# Patient Record
Sex: MALE | Race: WHITE | Employment: PART TIME | URBAN - METROPOLITAN AREA
[De-identification: names, ages, dates, MRNs, and addresses within clinical notes are randomized per-mention and may not be internally consistent; named-entity substitution may affect disease eponyms.]

---

## 2023-04-14 NOTE — DISCHARGE INSTRUCTIONS
Follow-up with your primary care physician in one week if symptoms have not improved or symptoms are starting to get worse. Increase fluids, keep well-hydrated. Take Tylenol and Motrin for fever and pain. Start taking over-the-counter antihistamines such as Zyrtec, Allegra, Claritin or Xyzal choose 1 take nightly  Over-the-counter Flonase can be helpful  Gargle with warm salt water  Return to the emergency room if your symptoms or concerns.

## 2023-06-10 ENCOUNTER — HOSPITAL ENCOUNTER (OUTPATIENT)
Age: 23
Discharge: HOME | End: 2023-06-10
Payer: MEDICAID

## 2023-06-10 VITALS
DIASTOLIC BLOOD PRESSURE: 83 MMHG | RESPIRATION RATE: 18 BRPM | OXYGEN SATURATION: 97 % | BODY MASS INDEX: 25.83 KG/M2 | HEART RATE: 99 BPM | HEIGHT: 65 IN | TEMPERATURE: 98 F | WEIGHT: 155 LBS | SYSTOLIC BLOOD PRESSURE: 121 MMHG

## 2023-06-10 DIAGNOSIS — L30.9 DERMATITIS: ICD-10-CM

## 2023-06-10 DIAGNOSIS — L03.116 CELLULITIS OF LEFT LOWER EXTREMITY: Primary | ICD-10-CM

## 2023-06-10 RX ORDER — CEPHALEXIN 500 MG/1
500 CAPSULE ORAL 4 TIMES DAILY
Qty: 20 CAPSULE | Refills: 0 | Status: SHIPPED | OUTPATIENT
Start: 2023-06-10 | End: 2023-06-15

## 2023-06-10 RX ORDER — DIAPER,BRIEF,INFANT-TODD,DISP
EACH MISCELLANEOUS 2 TIMES DAILY
COMMUNITY

## 2023-06-10 RX ORDER — TESTOSTERONE GEL, 1% 10 MG/G
GEL TRANSDERMAL
COMMUNITY
Start: 2023-06-06

## 2023-06-10 RX ORDER — TRIAMCINOLONE ACETONIDE 1 MG/G
CREAM TOPICAL DAILY
Qty: 80 G | Refills: 0 | Status: SHIPPED | OUTPATIENT
Start: 2023-06-10 | End: 2023-06-20

## 2023-06-10 NOTE — ED INITIAL ASSESSMENT (HPI)
Patient was bit by a spider about 2.5 weeks ago and tried OTC hydrocortisone. The bite seems to be getting worse with this cream now has had a red rash area around the bite. The patient also has a rash to bilateral elbows, ears, and feet that is papular and red. The red bumps started prior to the spider bite.

## 2023-06-10 NOTE — DISCHARGE INSTRUCTIONS
I believe the rash to your elbows and ears may be a form of eczema, and am recommending a steroid cream once daily for the next 10 days to your elbows, every other day to your ears. Do not use in either location for more then 10 days. I am starting you on Keflex, an antibiotic, for the skin on your left shin due to concern for infection. Take this antibiotic every 6 hours for the next 5 days. I also recommend covering this area with aquaphor or vasaline, multiple times a day to keep hydrated. 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 Sometimes rashes to hands/feet that are similar to yours can also be related to eczema/dermatitis. Wash hands frequently, wipe down common surfaces. You can also use there steroid cream I prescribed on your hands and feet, once daily for 10 days. Schedule an appointment to follow up with the dermatologist for reevaluation.    If you develop a fever, chills, or other new, worsening or concerning symptoms, return for reevaluation

## 2023-06-23 ENCOUNTER — HOSPITAL ENCOUNTER (OUTPATIENT)
Age: 23
Discharge: HOME | End: 2023-06-23
Payer: MEDICAID

## 2023-06-23 VITALS
HEART RATE: 84 BPM | DIASTOLIC BLOOD PRESSURE: 81 MMHG | TEMPERATURE: 98 F | RESPIRATION RATE: 18 BRPM | SYSTOLIC BLOOD PRESSURE: 126 MMHG | OXYGEN SATURATION: 98 % | HEIGHT: 65 IN | WEIGHT: 150 LBS | BODY MASS INDEX: 24.99 KG/M2

## 2023-06-23 DIAGNOSIS — J02.9 SORE THROAT: Primary | ICD-10-CM

## 2023-06-23 DIAGNOSIS — J06.9 UPPER RESPIRATORY TRACT INFECTION, UNSPECIFIED TYPE: ICD-10-CM

## 2023-06-23 LAB — S PYO AG THROAT QL: NEGATIVE

## 2023-06-23 PROCEDURE — 99213 OFFICE O/P EST LOW 20 MIN: CPT | Performed by: NURSE PRACTITIONER

## 2023-06-23 PROCEDURE — 87880 STREP A ASSAY W/OPTIC: CPT | Performed by: NURSE PRACTITIONER

## 2023-06-23 RX ORDER — FLUTICASONE PROPIONATE 50 MCG
2 SPRAY, SUSPENSION (ML) NASAL DAILY
Qty: 16 G | Refills: 0 | Status: SHIPPED | OUTPATIENT
Start: 2023-06-23 | End: 2023-07-23
